# Patient Record
Sex: MALE | Race: ASIAN | Employment: FULL TIME | URBAN - METROPOLITAN AREA
[De-identification: names, ages, dates, MRNs, and addresses within clinical notes are randomized per-mention and may not be internally consistent; named-entity substitution may affect disease eponyms.]

---

## 2020-12-08 PROBLEM — S46.912A LEFT SHOULDER STRAIN: Status: ACTIVE | Noted: 2020-12-08

## 2020-12-08 PROBLEM — N52.1 ERECTILE DYSFUNCTION DUE TO DISEASES CLASSIFIED ELSEWHERE: Status: ACTIVE | Noted: 2020-12-08

## 2020-12-08 PROBLEM — I10 ESSENTIAL HYPERTENSION: Status: ACTIVE | Noted: 2020-12-08

## 2020-12-08 PROBLEM — Z86.16 HISTORY OF 2019 NOVEL CORONAVIRUS DISEASE (COVID-19): Status: ACTIVE | Noted: 2020-12-08

## 2020-12-08 PROBLEM — Z82.49 FAMILY HISTORY OF HEART DISEASE: Status: ACTIVE | Noted: 2020-12-08

## 2020-12-08 PROBLEM — E11.22 TYPE 2 DM WITH CKD STAGE 4 AND HYPERTENSION (HCC): Status: ACTIVE | Noted: 2020-12-08

## 2020-12-08 PROBLEM — I12.9 TYPE 2 DM WITH CKD STAGE 4 AND HYPERTENSION (HCC): Status: ACTIVE | Noted: 2020-12-08

## 2020-12-08 PROBLEM — L40.9 PSORIASIS: Status: ACTIVE | Noted: 2020-12-08

## 2020-12-08 PROBLEM — E11.65 UNCONTROLLED TYPE 2 DIABETES MELLITUS WITH HYPERGLYCEMIA (HCC): Status: ACTIVE | Noted: 2020-12-08

## 2020-12-08 PROBLEM — E87.5 HYPERKALEMIA: Status: ACTIVE | Noted: 2020-12-08

## 2020-12-08 PROBLEM — N18.4 TYPE 2 DM WITH CKD STAGE 4 AND HYPERTENSION (HCC): Status: ACTIVE | Noted: 2020-12-08

## 2020-12-11 PROBLEM — N19 RENAL FAILURE: Status: ACTIVE | Noted: 2020-12-08

## 2021-04-11 DIAGNOSIS — Z23 NEED FOR VACCINATION: ICD-10-CM

## 2021-09-16 PROBLEM — N18.5 TYPE 2 DM WITH CKD STAGE 5 AND HYPERTENSION (HCC): Status: ACTIVE | Noted: 2021-09-16

## 2021-09-16 PROBLEM — N18.4 TYPE 2 DM WITH CKD STAGE 4 AND HYPERTENSION (HCC): Status: RESOLVED | Noted: 2020-12-08 | Resolved: 2021-09-16

## 2021-09-16 PROBLEM — N18.5 CKD (CHRONIC KIDNEY DISEASE) STAGE 5, GFR LESS THAN 15 ML/MIN (HCC): Status: ACTIVE | Noted: 2021-09-16

## 2021-09-16 PROBLEM — E11.22 TYPE 2 DM WITH CKD STAGE 5 AND HYPERTENSION (HCC): Status: ACTIVE | Noted: 2021-09-16

## 2021-09-16 PROBLEM — I12.0 TYPE 2 DM WITH CKD STAGE 5 AND HYPERTENSION (HCC): Status: ACTIVE | Noted: 2021-09-16

## 2021-09-16 PROBLEM — E11.22 TYPE 2 DM WITH CKD STAGE 4 AND HYPERTENSION (HCC): Status: RESOLVED | Noted: 2020-12-08 | Resolved: 2021-09-16

## 2021-09-16 PROBLEM — I12.9 TYPE 2 DM WITH CKD STAGE 4 AND HYPERTENSION (HCC): Status: RESOLVED | Noted: 2020-12-08 | Resolved: 2021-09-16

## 2022-06-14 RX ORDER — HYDRALAZINE HYDROCHLORIDE 25 MG/1
25 TABLET, FILM COATED ORAL 3 TIMES DAILY PRN
COMMUNITY

## 2022-06-14 RX ORDER — CLONIDINE HYDROCHLORIDE 0.1 MG/1
0.1 TABLET ORAL 2 TIMES DAILY
COMMUNITY

## 2022-06-16 ENCOUNTER — HOSPITAL ENCOUNTER (OUTPATIENT)
Facility: HOSPITAL | Age: 37
Setting detail: HOSPITAL OUTPATIENT SURGERY
Discharge: HOME | End: 2022-06-16
Attending: SURGERY | Admitting: SURGERY
Payer: COMMERCIAL

## 2022-06-16 ENCOUNTER — ANESTHESIA (OUTPATIENT)
Dept: SURGERY | Facility: HOSPITAL | Age: 37
End: 2022-06-16
Payer: COMMERCIAL

## 2022-06-16 ENCOUNTER — ANESTHESIA EVENT (OUTPATIENT)
Dept: SURGERY | Facility: HOSPITAL | Age: 37
End: 2022-06-16
Payer: COMMERCIAL

## 2022-06-16 VITALS
RESPIRATION RATE: 12 BRPM | HEART RATE: 69 BPM | SYSTOLIC BLOOD PRESSURE: 139 MMHG | WEIGHT: 186 LBS | DIASTOLIC BLOOD PRESSURE: 73 MMHG | TEMPERATURE: 97 F | BODY MASS INDEX: 27.55 KG/M2 | HEIGHT: 69 IN | OXYGEN SATURATION: 98 %

## 2022-06-16 LAB
GLUCOSE BLDC GLUCOMTR-MCNC: 117 MG/DL (ref 70–99)
GLUCOSE BLDC GLUCOMTR-MCNC: 166 MG/DL (ref 70–99)
POTASSIUM SERPL-SCNC: 5.6 MMOL/L (ref 3.5–5.1)

## 2022-06-16 PROCEDURE — 84132 ASSAY OF SERUM POTASSIUM: CPT | Performed by: SURGERY

## 2022-06-16 PROCEDURE — 82962 GLUCOSE BLOOD TEST: CPT

## 2022-06-16 PROCEDURE — 36415 COLL VENOUS BLD VENIPUNCTURE: CPT | Performed by: SURGERY

## 2022-06-16 PROCEDURE — 93010 ELECTROCARDIOGRAM REPORT: CPT | Performed by: STUDENT IN AN ORGANIZED HEALTH CARE EDUCATION/TRAINING PROGRAM

## 2022-06-16 PROCEDURE — 93005 ELECTROCARDIOGRAM TRACING: CPT

## 2022-06-16 PROCEDURE — 0WWG43Z REVISION OF INFUSION DEVICE IN PERITONEAL CAVITY, PERCUTANEOUS ENDOSCOPIC APPROACH: ICD-10-PCS | Performed by: SURGERY

## 2022-06-16 RX ORDER — HYDROMORPHONE HYDROCHLORIDE 1 MG/ML
0.4 INJECTION, SOLUTION INTRAMUSCULAR; INTRAVENOUS; SUBCUTANEOUS EVERY 5 MIN PRN
Status: DISCONTINUED | OUTPATIENT
Start: 2022-06-16 | End: 2022-06-16

## 2022-06-16 RX ORDER — TRAMADOL HYDROCHLORIDE 50 MG/1
50 TABLET ORAL EVERY 6 HOURS PRN
Qty: 10 TABLET | Refills: 0 | Status: SHIPPED | OUTPATIENT
Start: 2022-06-16

## 2022-06-16 RX ORDER — TRAMADOL HYDROCHLORIDE 50 MG/1
50 TABLET ORAL ONCE
Status: COMPLETED | OUTPATIENT
Start: 2022-06-16 | End: 2022-06-16

## 2022-06-16 RX ORDER — DOCUSATE SODIUM 100 MG/1
100 CAPSULE, LIQUID FILLED ORAL 2 TIMES DAILY
Qty: 14 CAPSULE | Refills: 1 | Status: SHIPPED | OUTPATIENT
Start: 2022-06-16 | End: 2022-06-23

## 2022-06-16 RX ORDER — SODIUM CHLORIDE 9 MG/ML
INJECTION, SOLUTION INTRAVENOUS CONTINUOUS
Status: DISCONTINUED | OUTPATIENT
Start: 2022-06-16 | End: 2022-06-16

## 2022-06-16 RX ORDER — MORPHINE SULFATE 4 MG/ML
2 INJECTION, SOLUTION INTRAMUSCULAR; INTRAVENOUS EVERY 10 MIN PRN
Status: DISCONTINUED | OUTPATIENT
Start: 2022-06-16 | End: 2022-06-16

## 2022-06-16 RX ORDER — SODIUM CHLORIDE, SODIUM LACTATE, POTASSIUM CHLORIDE, CALCIUM CHLORIDE 600; 310; 30; 20 MG/100ML; MG/100ML; MG/100ML; MG/100ML
INJECTION, SOLUTION INTRAVENOUS CONTINUOUS
Status: DISCONTINUED | OUTPATIENT
Start: 2022-06-16 | End: 2022-06-16

## 2022-06-16 RX ORDER — LIDOCAINE HYDROCHLORIDE 10 MG/ML
INJECTION, SOLUTION EPIDURAL; INFILTRATION; INTRACAUDAL; PERINEURAL AS NEEDED
Status: DISCONTINUED | OUTPATIENT
Start: 2022-06-16 | End: 2022-06-16 | Stop reason: SURG

## 2022-06-16 RX ORDER — ROCURONIUM BROMIDE 10 MG/ML
INJECTION, SOLUTION INTRAVENOUS AS NEEDED
Status: DISCONTINUED | OUTPATIENT
Start: 2022-06-16 | End: 2022-06-16 | Stop reason: SURG

## 2022-06-16 RX ORDER — DEXTROSE MONOHYDRATE 25 G/50ML
50 INJECTION, SOLUTION INTRAVENOUS
Status: DISCONTINUED | OUTPATIENT
Start: 2022-06-16 | End: 2022-06-16

## 2022-06-16 RX ORDER — MORPHINE SULFATE 4 MG/ML
4 INJECTION, SOLUTION INTRAMUSCULAR; INTRAVENOUS EVERY 10 MIN PRN
Status: DISCONTINUED | OUTPATIENT
Start: 2022-06-16 | End: 2022-06-16

## 2022-06-16 RX ORDER — BUPIVACAINE HYDROCHLORIDE AND EPINEPHRINE 2.5; 5 MG/ML; UG/ML
INJECTION, SOLUTION INFILTRATION; PERINEURAL AS NEEDED
Status: DISCONTINUED | OUTPATIENT
Start: 2022-06-16 | End: 2022-06-16 | Stop reason: HOSPADM

## 2022-06-16 RX ORDER — ONDANSETRON 2 MG/ML
INJECTION INTRAMUSCULAR; INTRAVENOUS AS NEEDED
Status: DISCONTINUED | OUTPATIENT
Start: 2022-06-16 | End: 2022-06-16 | Stop reason: SURG

## 2022-06-16 RX ORDER — NALOXONE HYDROCHLORIDE 0.4 MG/ML
80 INJECTION, SOLUTION INTRAMUSCULAR; INTRAVENOUS; SUBCUTANEOUS AS NEEDED
Status: DISCONTINUED | OUTPATIENT
Start: 2022-06-16 | End: 2022-06-16

## 2022-06-16 RX ORDER — NICOTINE POLACRILEX 4 MG
30 LOZENGE BUCCAL
Status: DISCONTINUED | OUTPATIENT
Start: 2022-06-16 | End: 2022-06-16

## 2022-06-16 RX ORDER — MORPHINE SULFATE 10 MG/ML
6 INJECTION, SOLUTION INTRAMUSCULAR; INTRAVENOUS EVERY 10 MIN PRN
Status: DISCONTINUED | OUTPATIENT
Start: 2022-06-16 | End: 2022-06-16

## 2022-06-16 RX ORDER — HYDROMORPHONE HYDROCHLORIDE 1 MG/ML
0.2 INJECTION, SOLUTION INTRAMUSCULAR; INTRAVENOUS; SUBCUTANEOUS EVERY 5 MIN PRN
Status: DISCONTINUED | OUTPATIENT
Start: 2022-06-16 | End: 2022-06-16

## 2022-06-16 RX ORDER — NICOTINE POLACRILEX 4 MG
15 LOZENGE BUCCAL
Status: DISCONTINUED | OUTPATIENT
Start: 2022-06-16 | End: 2022-06-16

## 2022-06-16 RX ORDER — CEFAZOLIN SODIUM/WATER 2 G/20 ML
2 SYRINGE (ML) INTRAVENOUS ONCE
Status: COMPLETED | OUTPATIENT
Start: 2022-06-16 | End: 2022-06-16

## 2022-06-16 RX ORDER — HYDROMORPHONE HYDROCHLORIDE 1 MG/ML
0.6 INJECTION, SOLUTION INTRAMUSCULAR; INTRAVENOUS; SUBCUTANEOUS EVERY 5 MIN PRN
Status: DISCONTINUED | OUTPATIENT
Start: 2022-06-16 | End: 2022-06-16

## 2022-06-16 RX ORDER — ACETAMINOPHEN 500 MG
1000 TABLET ORAL ONCE
Status: COMPLETED | OUTPATIENT
Start: 2022-06-16 | End: 2022-06-16

## 2022-06-16 RX ADMIN — LIDOCAINE HYDROCHLORIDE 50 MG: 10 INJECTION, SOLUTION EPIDURAL; INFILTRATION; INTRACAUDAL; PERINEURAL at 15:42:00

## 2022-06-16 RX ADMIN — ROCURONIUM BROMIDE 10 MG: 10 INJECTION, SOLUTION INTRAVENOUS at 16:26:00

## 2022-06-16 RX ADMIN — CEFAZOLIN SODIUM/WATER 2 G: 2 G/20 ML SYRINGE (ML) INTRAVENOUS at 15:47:00

## 2022-06-16 RX ADMIN — ONDANSETRON 4 MG: 2 INJECTION INTRAMUSCULAR; INTRAVENOUS at 17:18:00

## 2022-06-16 RX ADMIN — ROCURONIUM BROMIDE 40 MG: 10 INJECTION, SOLUTION INTRAVENOUS at 15:43:00

## 2022-06-16 RX ADMIN — ROCURONIUM BROMIDE 10 MG: 10 INJECTION, SOLUTION INTRAVENOUS at 16:13:00

## 2022-06-16 NOTE — OPERATIVE REPORT
[facility] REPORT    PATIENT NAME: Merlyn Olivarez  : 1985   MRN: [id number]  [id number] B:   22    PREOPERATIVE DIAGNOSIS:  end-stage chronic kidney disease with nonfunctional peritoneal dialysis catheter       POSTOPERATIVE DIAGNOSIS:   end-stage chronic kidney disease with nonfunctional peritoneal dialysis catheter, abdominal adhesions    PROCEDURE PERFORMED: Diagnostic laparoscopy, laparoscopic lysis of adhesions, laparoscopic revision of peritoneal dialysis catheter, laparoscopic omentopexy       SURGEON:  Allan Oneil MD    ASST: Jaylen Torres CSA   (Assistant helped position patient and helped with positioning, retraction, suturing, closure, dressings etc.)       ANESTHESIA: General endotracheal tube    ESTIMATED BLOOD LOSS:   5 ml    COMPLICATIONS: none    INDICATIONS:   This is a 39year old male who presents with a history of end-stage chronic kidney disease. Patient has previous peritoneal dialysis catheter inserted percutaneously by IR. Catheter is not functioning with no with removal of fluid after 2 L are infused in. An extensive discussion with the patient as etiology of the above. I discussed the need for laparoscopic revision of peritoneal dialysis catheter possible removal peritoneal dialysis catheter in reinsertion of peritoneal dialysis catheter. After discussion with nephrology the patient has decided to continue on peritoneal dialysis catheter insertion.   Risk of procedure including bleeding, infection, postoperative hematoma, wound infection, perforated hollow viscus, vascular injury, need to convert to an open procedure, infected catheter, nonfunctional catheter, need for revision of catheter, need for lifelong hemodialysis, as well as risk with anesthesia including myocardial infarction, respiratory failure, renal failure, pulmonary embolus, DVT, CVA, and even death were all discussed in detail with the patient who understands consents and wishes to proceed with the operation. OPERATIVE TECHNIQUE: The Patient was brought to the operating room, and placed on the operating table in supine position. General anesthetic was administered via endotracheal tube by anesthesia. The patient's stomach was decompressed with an orogastric tube, and the bladder was decompressed with a Solorzano catheter. The abdomen was prepped and draped in a sterile fashion. Pneumoperitoneum was established approximately 15 mmHg via the peritoneal dialysis catheter. Local anesthetic was anesthetized left upper quadrant. A small incision was made in the  left  upper quadrant, and a Veress needle was introduced into the abdominal cavity without difficulty. Using the saline drop test, placement was confirmed and pneumoperitoneum was established to approximately 15 mmHg. Next, a 5 mm Visiport was inserted into the abdominal cavity and exploration of all 4 quadrants revealed no evidence of bowel injury or vascular injury present. Next, a 5 mm disposable trocar was placed in the  right upper quadrant under direct visualization without difficulty. The pelvis was free of adhesions. There were numerous adhesions in the pelvis. The omentum was wrapped around the peritoneal dialysis catheter and this was a source of amount of function. A third 5 mm Visiport was inserted in the epigastric region direct visualization to facilitate lysis of adhesions and removal of the fibrinous exudate within the peritoneal dialysis catheter. The catheter was freed up and the omentum was freed up around the catheter. Laparoscopic lysis adhesions was performed. At this time the catheter was flushed with approximately 60 mL of saline solution completely clean the catheter of the fibrinous exudate. It was decided that the catheter will be same and can be used after omentopexy was performed and pexing the catheter in place.    At this time, the catheter was pexied to the anterior abdominal wall with 0 Prolene simple interrupted sutures in the suprapubic region using the Endo Close. This provided catheter from flipping up to the upper abdominal region. 2 sutures were placed to prevent the catheter from moving. .  The laparoscopic omentopexy was performed by elevating the omentum in the left upper quadrant and right upper quadrant with grasper. Using an [address] and an 0 Prolene suture, the laparoscopic omentopexy was performed in several locations to prevent the omentum from twisting and getting caught up into the catheter. The omentum was quite long and had to have several areas to perform omentopexy to assure adequate fixation. Next, the remaining pneumoperitoneum was removed and the dialysate solution was irrigated, 2 L in and 2 L out, without any difficulty. There was excellent flow present. The trocars were removed. The wounds were closed with 4-0 Vicryl subcuticular suture. The catheter entrance site was closed with 2-0 Vicryl simple interrupted suture as well as 3-0 Vicryl running subcuticular suture. Mastisol and Steri-Strips were applied to the wound. The catheter was attached to the locking device and flushed with heparin/saline solution. Sterile dressings were applied to the wound. Patient was transferred off the operative table to recovery room, extubated, in stable condition. All counts were correct at the end of the case.         [address] [facility]    6/16/2022  5:22 PM  Myron Urrutia MD

## 2022-06-16 NOTE — BRIEF OP NOTE
Pre-Operative Diagnosis: mechanical complication of peritoneal dialysis catheter     Post-Operative Diagnosis: mechanical complication of peritoneal dialysis catheter      Procedure Performed:   Laparoscopic revision of peritoneal dialysis catheter, omentopexy, lysis of adhesions    Surgeon(s) and Role:     Anastacio Dubose MD - Primary    Assistant(s):  Surgical Assistant.: Jana Contreras CSA     Surgical Findings: Peritoneal adhesion     Specimen: None     Estimated Blood Loss: 5 mL    Dictation Number:      Chastity Quan MD  6/16/2022  5:21 PM

## 2022-06-16 NOTE — INTERVAL H&P NOTE
Pre-op Diagnosis: mechanical complication of peritoneal dialysis catheter    The above referenced H&P was reviewed by Billy Kern MD on 6/16/2022, the patient was examined and no significant changes have occurred in the patient's condition since the H&P was performed. I discussed with the patient and/or legal representative the potential benefits, risks and side effects of this procedure; the likelihood of the patient achieving goals; and potential problems that might occur during recuperation. I discussed reasonable alternatives to the procedure, including risks, benefits and side effects related to the alternatives and risks related to not receiving this procedure. We will proceed with procedure as planned.

## 2022-06-16 NOTE — ANESTHESIA PROCEDURE NOTES
Airway  Date/Time: 6/16/2022 3:44 PM  Urgency: Elective    Airway not difficult    General Information and Staff    Patient location during procedure: OR  Anesthesiologist: Loyda Moseley MD  Resident/CRNA: Alejandro Stover CRNA  Performed: CRNA     Indications and Patient Condition  Indications for airway management: anesthesia  Spontaneous Ventilation: absent  Sedation level: deep  Preoxygenated: yes  Patient position: sniffing  Mask difficulty assessment: 1 - vent by mask    Final Airway Details  Final airway type: endotracheal airway      Successful airway: ETT  Cuffed: yes   Successful intubation technique: direct laryngoscopy  Endotracheal tube insertion site: oral  Blade: Edward  Blade size: #3  ETT size (mm): 7.5    Cormack-Lehane Classification: grade I - full view of glottis  Placement verified by: chest auscultation and capnometry   Cuff volume (mL): 7  Measured from: teeth  ETT to teeth (cm): 21  Number of attempts at approach: 1

## (undated) DEVICE — 3M™ IOBAN™ 2 ANTIMICROBIAL INCISE DRAPE 6650EZ: Brand: IOBAN™ 2

## (undated) DEVICE — LAP CHOLE: Brand: MEDLINE INDUSTRIES, INC.

## (undated) DEVICE — TRAY SURESTEP 16 BARDEX DRAIN

## (undated) DEVICE — SUT PROLENE 0 CT-1 8424H

## (undated) DEVICE — GAMMEX® PI HYBRID SIZE 7.5, STERILE POWDER-FREE SURGICAL GLOVE, POLYISOPRENE AND NEOPRENE BLEND: Brand: GAMMEX

## (undated) DEVICE — [HIGH FLOW INSUFFLATOR,  DO NOT USE IF PACKAGE IS DAMAGED,  KEEP DRY,  KEEP AWAY FROM SUNLIGHT,  PROTECT FROM HEAT AND RADIOACTIVE SOURCES.]: Brand: PNEUMOSURE

## (undated) DEVICE — TROCAR: Brand: KII FIOS FIRST ENTRY

## (undated) DEVICE — TRAY SKIN PREP PVP-1

## (undated) DEVICE — STAY.SAFE® CATHETER EXTENSION SET WITH LUER-LOCK, 18 INCH: Brand: STAY.SAFE®

## (undated) DEVICE — 20 ML SYRINGE LUER-LOCK TIP: Brand: MONOJECT

## (undated) DEVICE — DEVICE PORT SITE CLOSURE

## (undated) DEVICE — 3M(TM) TEGADERM(TM) TRANSPARENT FILM DRESSING FRAME STYLE 1628: Brand: 3M™ TEGADERM™

## (undated) DEVICE — UNDYED BRAIDED (POLYGLACTIN 910), SYNTHETIC ABSORBABLE SUTURE: Brand: COATED VICRYL

## (undated) DEVICE — 3M™ STERI-STRIP™ REINFORCED ADHESIVE SKIN CLOSURES, R1547, 1/2 IN X 4 IN (12 MM X 100 MM), 6 STRIPS/ENVELOPE: Brand: 3M™ STERI-STRIP™

## (undated) DEVICE — SOLUTION  .9 1000ML BTL

## (undated) DEVICE — Device: Brand: JELCO

## (undated) DEVICE — CONNECTOR DLYS PERITONEUM 2

## (undated) DEVICE — TROCAR: Brand: KII® SLEEVE

## (undated) DEVICE — STAY.SAFE® CAP: Brand: STAY.SAFE®